# Patient Record
Sex: MALE | Race: WHITE
[De-identification: names, ages, dates, MRNs, and addresses within clinical notes are randomized per-mention and may not be internally consistent; named-entity substitution may affect disease eponyms.]

---

## 2018-12-10 ENCOUNTER — HOSPITAL ENCOUNTER (OUTPATIENT)
Age: 68
End: 2018-12-10
Payer: MEDICARE

## 2018-12-10 DIAGNOSIS — I08.3: Primary | ICD-10-CM

## 2018-12-10 PROCEDURE — 93306 TTE W/DOPPLER COMPLETE: CPT

## 2019-02-27 ENCOUNTER — HOSPITAL ENCOUNTER (OUTPATIENT)
Age: 69
End: 2019-02-27
Payer: MEDICARE

## 2019-02-27 DIAGNOSIS — R06.02: Primary | ICD-10-CM

## 2019-02-27 PROCEDURE — 94726 PLETHYSMOGRAPHY LUNG VOLUMES: CPT

## 2019-02-27 PROCEDURE — 94729 DIFFUSING CAPACITY: CPT

## 2019-02-27 PROCEDURE — 94060 EVALUATION OF WHEEZING: CPT

## 2019-03-01 NOTE — PM.PFT.1
Pulmonary Function Test
Referral & Results
Date Patient Seen: 02/27/19
Requesting provider: Janneth Carrillo
Indication: R06.02
Results: The spirometry demonstrates an FVC of 4.33 L which is 87% of predicted.

The FEV1 was measured at 3.19 L which is 86% of predicted.

The FEV1/FVC ratio was 70 for which is 99% of predicted.

Following the administration of bronchodilator there was a 34% improvement in FEF 25-75%.

Lung volumes show an SVC of 4.42 L which is 87% of predicted.

The diffusing capacity was measured at 25.90 which is 71% of predicted.  No hemoglobin value was provided, so no correction for potential anemia could be made, if appropriate.

The maximum voluntary ventilation was normal


Interpretation: This study demonstrates perhaps mild obstructive lung disease, based on slight reduction in FEV1, with some limited evidence of benefit following bronchodilator administration based on improvement in FEF 25-75%.

There may also be mild restrictive lung disease present based on minimal reduction in lung volumes

There is also reduction in diffusing capacity suggesting element of disease at the capillary alveolar level

Clinical correlation suggested

## 2021-04-09 ENCOUNTER — HOSPITAL ENCOUNTER (OUTPATIENT)
Age: 71
End: 2021-04-09
Payer: MEDICARE

## 2021-04-09 DIAGNOSIS — I34.0: ICD-10-CM

## 2021-04-09 DIAGNOSIS — I48.20: Primary | ICD-10-CM

## 2021-04-09 DIAGNOSIS — I77.810: ICD-10-CM

## 2021-04-09 PROCEDURE — 93306 TTE W/DOPPLER COMPLETE: CPT

## 2021-06-16 ENCOUNTER — HOSPITAL ENCOUNTER (OUTPATIENT)
Age: 71
End: 2021-06-16
Payer: MEDICARE

## 2021-06-16 DIAGNOSIS — E78.6: Primary | ICD-10-CM

## 2021-06-16 LAB
ALBUMIN SERPL-MCNC: 3.7 G/DL (ref 3.5–5)
ALBUMIN/GLOB SERPL: 1.2 {RATIO} (ref 1–2.8)
ALP SERPL-CCNC: 77 U/L (ref 38–126)
ALT SERPL-CCNC: 19 IU/L (ref ?–50)
BUN SERPL-MCNC: 23 MG/DL (ref 9–20)
CALCIUM SERPL-MCNC: 9.1 MG/DL (ref 8.4–10.2)
CHLORIDE SERPL-SCNC: 102 MMOL/L (ref 98–107)
CHOLEST SERPL-MCNC: 149 MG/DL (ref 140–199)
CO2 SERPL-SCNC: 29 MMOL/L (ref 22–32)
ESTIMATED GLOMERULAR FILT RATE: > 60 ML/MIN (ref 60–?)
GLOBULIN SER CALC-MCNC: 3 G/DL (ref 1.7–4.1)
GLUCOSE SERPL-MCNC: 91 MG/DL (ref 80–110)
HDLC SERPL-MCNC: 41 MG/DL (ref 40–60)
HEMOLYSIS: 15 (ref 0–50)
POTASSIUM SERPL-SCNC: 4.5 MMOL/L (ref 3.4–5.1)
PROT SERPL-MCNC: 6.7 G/DL (ref 6.3–8.2)
SODIUM SERPL-SCNC: 137 MMOL/L (ref 137–145)
TRIGL SERPL-MCNC: 140 MG/DL (ref 35–150)

## 2021-06-16 PROCEDURE — 80061 LIPID PANEL: CPT

## 2021-06-16 PROCEDURE — 80053 COMPREHEN METABOLIC PANEL: CPT

## 2022-03-29 ENCOUNTER — HOSPITAL ENCOUNTER (OUTPATIENT)
Age: 72
End: 2022-03-29
Payer: MEDICARE

## 2022-03-29 DIAGNOSIS — I08.3: Primary | ICD-10-CM

## 2022-03-29 DIAGNOSIS — I77.89: ICD-10-CM

## 2022-03-29 PROCEDURE — 93306 TTE W/DOPPLER COMPLETE: CPT

## 2022-06-03 ENCOUNTER — HOSPITAL ENCOUNTER (OUTPATIENT)
Age: 72
End: 2022-06-03
Payer: MEDICARE

## 2022-06-03 DIAGNOSIS — R07.89: Primary | ICD-10-CM

## 2022-06-03 LAB
ALBUMIN SERPL-MCNC: 4 G/DL (ref 3.5–5)
ALBUMIN/GLOB SERPL: 1.2 {RATIO} (ref 1–2.8)
ALP SERPL-CCNC: 77 U/L (ref 38–126)
ALT SERPL-CCNC: 24 IU/L (ref ?–50)
BUN SERPL-MCNC: 29 MG/DL (ref 9–20)
CALCIUM SERPL-MCNC: 8.7 MG/DL (ref 8.4–10.2)
CHLORIDE SERPL-SCNC: 107 MMOL/L (ref 98–107)
CHOLEST SERPL-MCNC: 152 MG/DL (ref 140–199)
CO2 SERPL-SCNC: 27 MMOL/L (ref 22–32)
ESTIMATED GLOMERULAR FILT RATE: > 60 ML/MIN (ref 60–?)
GLOBULIN SER CALC-MCNC: 3.4 G/DL (ref 1.7–4.1)
GLUCOSE SERPL-MCNC: 95 MG/DL (ref 80–110)
HDLC SERPL-MCNC: 41 MG/DL (ref 40–60)
HEMOLYSIS: < 15 (ref 0–50)
POTASSIUM SERPL-SCNC: 4.6 MMOL/L (ref 3.4–5.1)
PROT SERPL-MCNC: 7.4 G/DL (ref 6.3–8.2)
SODIUM SERPL-SCNC: 141 MMOL/L (ref 137–145)
TRIGL SERPL-MCNC: 98 MG/DL (ref 35–150)

## 2022-06-03 PROCEDURE — 80061 LIPID PANEL: CPT

## 2022-06-03 PROCEDURE — 80053 COMPREHEN METABOLIC PANEL: CPT

## 2023-06-13 ENCOUNTER — HOSPITAL ENCOUNTER (OUTPATIENT)
Age: 73
End: 2023-06-13
Payer: MEDICARE

## 2023-06-13 DIAGNOSIS — C61: ICD-10-CM

## 2023-06-13 DIAGNOSIS — I48.91: ICD-10-CM

## 2023-06-13 DIAGNOSIS — G47.33: Primary | ICD-10-CM

## 2023-06-13 DIAGNOSIS — E78.2: ICD-10-CM

## 2023-06-13 LAB
ADD MANUAL DIFF / SLIDE REVIEW: NO
ALBUMIN SERPL-MCNC: 4.1 G/DL (ref 3.5–5)
ALBUMIN/GLOB SERPL: 1.1 {RATIO} (ref 1–2.8)
ALP SERPL-CCNC: 81 U/L (ref 38–126)
ALT SERPL-CCNC: 25 IU/L (ref ?–50)
BUN SERPL-MCNC: 21 MG/DL (ref 9–20)
CALCIUM SERPL-MCNC: 9.2 MG/DL (ref 8.4–10.2)
CHLORIDE SERPL-SCNC: 101 MMOL/L (ref 98–107)
CHOLEST SERPL-MCNC: 175 MG/DL (ref 140–199)
CO2 SERPL-SCNC: 32 MMOL/L (ref 22–32)
ESTIMATED GLOMERULAR FILT RATE: > 60 ML/MIN (ref 60–?)
GLOBULIN SER CALC-MCNC: 3.7 G/DL (ref 1.7–4.1)
GLUCOSE SERPL-MCNC: 93 MG/DL (ref 80–110)
HDLC SERPL-MCNC: 39 MG/DL (ref 40–60)
HEMATOCRIT: 46.7 % (ref 41–53)
HEMOGLOBIN: 15.9 G/DL (ref 13.5–17.5)
HEMOLYSIS: < 15 (ref 0–50)
LYMPHOCYTES # SPEC AUTO: 1300 /UL (ref 1100–4500)
MCV RBC: 103.5 FL (ref 80–100)
MEAN CORPUSCULAR HEMOGLOBIN: 35.2 PG (ref 26–34)
MEAN CORPUSCULAR HGB CONC: 34 % (ref 30–36)
PLATELET COUNT: 187 X10^3/UL (ref 150–400)
POTASSIUM SERPL-SCNC: 4.8 MMOL/L (ref 3.4–5.1)
PROT SERPL-MCNC: 7.8 G/DL (ref 6.3–8.2)
PSA SERPL-MCNC: < 0.064 NG/ML (ref 0.1–4)
SODIUM SERPL-SCNC: 137 MMOL/L (ref 137–145)
TRIGL SERPL-MCNC: 107 MG/DL (ref 35–150)

## 2023-06-13 PROCEDURE — 80061 LIPID PANEL: CPT

## 2023-06-13 PROCEDURE — 85025 COMPLETE CBC W/AUTO DIFF WBC: CPT

## 2023-06-13 PROCEDURE — 80053 COMPREHEN METABOLIC PANEL: CPT

## 2023-06-13 PROCEDURE — 84153 ASSAY OF PSA TOTAL: CPT

## 2023-07-12 ENCOUNTER — HOSPITAL ENCOUNTER (OUTPATIENT)
Age: 73
End: 2023-07-12
Payer: MEDICARE

## 2023-07-12 DIAGNOSIS — D75.89: Primary | ICD-10-CM

## 2023-07-12 LAB
FOLATE SERPL-MCNC: > 20 NG/ML (ref 2.76–20)
VIT B12 SERPL-MCNC: 444 PG/ML (ref 239–931)

## 2023-07-12 PROCEDURE — 82607 VITAMIN B-12: CPT

## 2023-07-12 PROCEDURE — 82746 ASSAY OF FOLIC ACID SERUM: CPT

## 2023-08-17 ENCOUNTER — HOSPITAL ENCOUNTER (EMERGENCY)
Age: 73
Discharge: HOME | End: 2023-08-17
Payer: MEDICARE

## 2023-08-17 VITALS
SYSTOLIC BLOOD PRESSURE: 130 MMHG | OXYGEN SATURATION: 97 % | DIASTOLIC BLOOD PRESSURE: 72 MMHG | HEART RATE: 68 BPM | RESPIRATION RATE: 18 BRPM | TEMPERATURE: 98.3 F

## 2023-08-17 VITALS — OXYGEN SATURATION: 97 % | DIASTOLIC BLOOD PRESSURE: 61 MMHG | HEART RATE: 62 BPM | SYSTOLIC BLOOD PRESSURE: 129 MMHG

## 2023-08-17 VITALS — BODY MASS INDEX: 28.6 KG/M2

## 2023-08-17 DIAGNOSIS — M25.561: Primary | ICD-10-CM

## 2023-08-17 DIAGNOSIS — R79.1: ICD-10-CM

## 2023-08-17 LAB
ADD MANUAL DIFF / SLIDE REVIEW: NO
HEMATOCRIT: 47.6 % (ref 41–53)
HEMOGLOBIN: 16.3 G/DL (ref 13.5–17.5)
INR PPP: 3 (ref 0.9–1.3)
LYMPHOCYTES # SPEC AUTO: 1800 /UL (ref 1100–4500)
MCV RBC: 102.8 FL (ref 80–100)
MEAN CORPUSCULAR HEMOGLOBIN: 35.3 PG (ref 26–34)
MEAN CORPUSCULAR HGB CONC: 34.3 % (ref 30–36)
PLATELET COUNT: 174 X10^3/UL (ref 150–400)
PROTHROMBIN TIME: 34.8 SECONDS (ref 10.1–12.7)

## 2023-08-17 PROCEDURE — 99284 EMERGENCY DEPT VISIT MOD MDM: CPT

## 2023-08-17 PROCEDURE — 85025 COMPLETE CBC W/AUTO DIFF WBC: CPT

## 2023-08-17 PROCEDURE — 85610 PROTHROMBIN TIME: CPT

## 2023-08-17 PROCEDURE — 73562 X-RAY EXAM OF KNEE 3: CPT

## 2023-08-17 PROCEDURE — 86140 C-REACTIVE PROTEIN: CPT

## 2023-08-17 PROCEDURE — 36415 COLL VENOUS BLD VENIPUNCTURE: CPT

## 2023-09-14 ENCOUNTER — HOSPITAL ENCOUNTER (EMERGENCY)
Age: 73
Discharge: HOME | End: 2023-09-14
Payer: MEDICARE

## 2023-09-14 VITALS — DIASTOLIC BLOOD PRESSURE: 84 MMHG | HEART RATE: 119 BPM | SYSTOLIC BLOOD PRESSURE: 137 MMHG

## 2023-09-14 VITALS
RESPIRATION RATE: 17 BRPM | SYSTOLIC BLOOD PRESSURE: 139 MMHG | DIASTOLIC BLOOD PRESSURE: 72 MMHG | HEART RATE: 100 BPM | OXYGEN SATURATION: 99 %

## 2023-09-14 VITALS
OXYGEN SATURATION: 98 % | TEMPERATURE: 96.44 F | HEART RATE: 88 BPM | SYSTOLIC BLOOD PRESSURE: 131 MMHG | RESPIRATION RATE: 20 BRPM | DIASTOLIC BLOOD PRESSURE: 94 MMHG

## 2023-09-14 VITALS — BODY MASS INDEX: 27.3 KG/M2

## 2023-09-14 DIAGNOSIS — Z79.01: ICD-10-CM

## 2023-09-14 DIAGNOSIS — R04.0: Primary | ICD-10-CM

## 2023-09-14 DIAGNOSIS — I48.91: ICD-10-CM

## 2023-09-14 PROCEDURE — 30903 CONTROL OF NOSEBLEED: CPT

## 2023-09-14 PROCEDURE — 99283 EMERGENCY DEPT VISIT LOW MDM: CPT

## 2024-10-29 ENCOUNTER — HOSPITAL ENCOUNTER (OUTPATIENT)
Age: 74
End: 2024-10-29
Payer: MEDICARE

## 2024-10-29 DIAGNOSIS — E78.5: Primary | ICD-10-CM

## 2024-10-29 DIAGNOSIS — I48.20: ICD-10-CM

## 2024-10-29 LAB
ALBUMIN SERPL-MCNC: 4.2 G/DL (ref 3.5–5)
ALBUMIN/GLOB SERPL: 1.1 {RATIO} (ref 1–2.8)
ALP SERPL-CCNC: 87 U/L (ref 38–126)
ALT SERPL-CCNC: 29 IU/L (ref ?–50)
BUN SERPL-MCNC: 31 MG/DL (ref 9–20)
CALCIUM SERPL-MCNC: 9.4 MG/DL (ref 8.4–10.2)
CHLORIDE SERPL-SCNC: 102 MMOL/L (ref 98–107)
CHOLEST SERPL-MCNC: 131 MG/DL (ref 140–199)
CO2 SERPL-SCNC: 29 MMOL/L (ref 22–32)
ESTIMATED GLOMERULAR FILT RATE: > 60 ML/MIN (ref 60–?)
GLOBULIN SER CALC-MCNC: 3.7 G/DL (ref 1.7–4.1)
GLUCOSE SERPL-MCNC: 105 MG/DL (ref 80–110)
HDLC SERPL-MCNC: 48 MG/DL (ref 40–60)
HEMATOCRIT: 48.1 % (ref 41–53)
HEMOGLOBIN: 16.4 G/DL (ref 13.5–17.5)
HEMOLYSIS: 23 (ref 0–50)
MAGNESIUM SERPL-MCNC: 1.8 MG/DL (ref 1.6–2.3)
MCV RBC: 105.9 FL (ref 80–100)
MEAN CORPUSCULAR HEMOGLOBIN: 36.2 PG (ref 26–34)
MEAN CORPUSCULAR HGB CONC: 34.2 % (ref 30–36)
PLATELET COUNT: 215 X10^3/UL (ref 150–400)
POTASSIUM SERPL-SCNC: 5 MMOL/L (ref 3.4–5.1)
PROT SERPL-MCNC: 7.9 G/DL (ref 6.3–8.2)
SODIUM SERPL-SCNC: 136 MMOL/L (ref 137–145)
TRIGL SERPL-MCNC: 75 MG/DL (ref 35–150)
TSH SERPL DL<=0.05 MIU/L-ACNC: 2.17 UIU/ML (ref 0.47–4.68)

## 2024-10-29 PROCEDURE — 85027 COMPLETE CBC AUTOMATED: CPT

## 2024-10-29 PROCEDURE — 83735 ASSAY OF MAGNESIUM: CPT

## 2024-10-29 PROCEDURE — 84443 ASSAY THYROID STIM HORMONE: CPT

## 2024-10-29 PROCEDURE — 80061 LIPID PANEL: CPT

## 2024-10-29 PROCEDURE — 80053 COMPREHEN METABOLIC PANEL: CPT

## 2025-01-13 ENCOUNTER — HOSPITAL ENCOUNTER (OUTPATIENT)
Dept: HOSPITAL 73 - CT | Age: 75
End: 2025-01-13
Payer: MEDICARE

## 2025-01-13 DIAGNOSIS — N62: ICD-10-CM

## 2025-01-13 DIAGNOSIS — F17.218: ICD-10-CM

## 2025-01-13 DIAGNOSIS — Z12.2: Primary | ICD-10-CM

## 2025-01-13 DIAGNOSIS — J98.4: ICD-10-CM

## 2025-01-13 DIAGNOSIS — J47.9: ICD-10-CM

## 2025-01-13 PROCEDURE — 71271 CT THORAX LUNG CANCER SCR C-: CPT

## 2025-01-13 NOTE — DI.CT.S_ITS
PROCEDURE:  CT LUNG LOW DOSE SCREENING 
  
INDICATIONS:  lung cancer screening 
  
TECHNIQUE:   
Noncontrast 2.0-2.5 mm thick sections acquired from the pulmonary apices to the posterior  
costophrenic angles.  7 mm thick axial MIP, and 5 mm coronal and sagittal reformats were  
then acquired.  For radiation dose reduction, the following was used:  automated exposure  
control, adjustment of mA and/or kV according to patient size.   
  
COMPARISON:  [facility], CT, CT LOW DOSE LUNG CA SCREENING, 6/20/2023, 10:38.  
 [facility], CT, CT LOW DOSE LUNG CA SCREENING, 6/27/2022, 13:19. 
  
FINDINGS:   
Image quality:  Diagnostic.   
  
Lower Neck: No enlarged lymph nodes.   
Thyroid: No thyroid nodules which require sonographic follow up, per consensus  
guidelines. 
Axillae: No enlarged lymph nodes. 
Chest Wall:  Gynecomastia.   
Bones:  No suspicious osseous lesion.    
  
Lungs and Pleura: No pneumothorax or pleural effusions.  No consolidation.  Peripheral  
reticular thickening, similar.  No honeycombing appreciated.  Minimal secretions in the  
lower trachea.  Mild bronchiectasis.  Numerous calcified granuloma.  No new pulmonary  
nodules identified.  No mass. 
  
Heart: Heart size is normal.  No pericardial effusion. 
Thoracic Vessels: The aorta and pulmonary arteries demonstrate normal size.   
Mediastinum and Sharon: No enlarged lymph nodes.   
Esophagus: No wall thickening. No hiatal hernia.    
  
Upper Abdomen:  Visualized upper abdomen solid organs and bowel loops appear normal.   
Calcified granuloma in the spleen. 
  
  
IMPRESSION:   
  
No suspicious pulmonary nodules identified. 
  
LUNG-RADS 1; continued annual screening, if eligible. 
  
Clinically Significant Non-pulmonary Findings:  Peripheral reticular thickening.  Mild  
bronchiectasis.  Findings consistent with interstitial lung disease.  Not significantly  
changed. 
  
  
Dictated by: Yayo Moon M.D. on 1/13/2025 at 11:39      
Approved by: Yayo Moon M.D. on 1/13/2025 at 11:48

## 2025-01-29 ENCOUNTER — HOSPITAL ENCOUNTER (OUTPATIENT)
Dept: HOSPITAL 73 - ECHO | Age: 75
End: 2025-01-29
Payer: MEDICARE

## 2025-01-29 DIAGNOSIS — I77.89: ICD-10-CM

## 2025-01-29 DIAGNOSIS — I08.3: Primary | ICD-10-CM

## 2025-01-29 DIAGNOSIS — I77.810: ICD-10-CM

## 2025-01-29 PROCEDURE — 93306 TTE W/DOPPLER COMPLETE: CPT

## 2025-01-29 NOTE — DI.ECHO.S_ITS
[facility] 
+---------+                        [facility] 
:         :                      [id number] 
:         :                      [address] [place] 
:         :                          [id number] 
:         :                       Phone: [phone] 
[phone]                        [phone] 
                             Echocardiogram Report 
+-----------------------------------------------------------------------------+ 
:Name: SY HOYT        Study Date: 2025         Height: 72 in  : 
:[facility] MRN #: [id number]     ReadingLocation:               Weight: 210 lb : 
:Account #: [id number]          Gender: Male                   BSA: 2.2 m2    : 
:: 1950                Age: 74 yrs                    BP: 139/77 mmHg: 
:Reason For Study: MITRAL VALVE REGURGITATION                                 : 
:Ordering Physician: ROLA,                                                 : 
:DALILA                          Performed By: Tisha Barry                    : 
:Referring: DALILA CANELA                                                    : 
+-----------------------------------------------------------------------------+ 
Interpretation Summary 
The patient was in atrial fibrillation with heart rates between 62-76 bpm 
during the exam. 
  
The left ventricle is normal in size. 
The left ventricular ejection fraction is normal. 
The ejection fraction is estimated to be 60-65%. No significant change in LVEF 
from the previous study. 
  
The right ventricle is mildly dilated. 
The right ventricular systolic function is normal. 
  
There is mild to moderate mitral regurgitation. 
Mild to moderate AI. 
The tricuspid annulus is dilated. 
There is mild to moderate tricuspid regurgitation. 
The right ventricular systolic pressure is estimated to be at least 42 mmHg 
based on an estimated right atrial pressure of 8 mm Hg. 
  
Previous echo, mild MR, mild AR, mild TR and PASP 24 mmHg. 
  
The ascending aorta is mildly enlarged. 0.0 cm in diameter. Previously 4.1 cm 
in diameter. 
  
Procedure:   A two-dimensional transthoracic echocardiogram with color flow 
and Doppler was performed. The study quality was technically adequate. 
Comparison is made with the echocardiogram of 2022. The patient was in 
atrial fibrillation with heart rates between 62-76 bpm during the exam. 
Left Ventricle:   The left ventricle is normal in size. Left ventricular wall 
thickness is mildly increased. There is no thrombus. The ejection fraction is 
estimated to be 60-65%. The left ventricular ejection fraction is normal. 
There are no focal wall motion abnormalities. Diastolic function could not be 
accurately assessed due to atrial fibrillation. 
Right Ventricle:   The right ventricle is mildly dilated. The right 
ventricular systolic function is normal. 
Atria:   The left atrium is severely dilated. There has been no significant 
change since the previous study. The right atrium is severely dilated. The 
right atrium has mildly increased in size since the prior echo exam. There is 
no Doppler evidence for an interatrial shunt. 
Mitral Valve:   The mitral valve leaflets appear mildly thickened, but open 
well. There is mild to moderate mitral annular calcification. There is mild to 
moderate mitral regurgitation. 
Aortic Valve:   The aortic valve is trileaflet. The aortic valve opens well. 
The aortic valve is mildly calcified. There is no aortic valve stenosis. There 
is mild to moderate aortic regurgitation. 
Tricuspid Valve:   The tricuspid annulus is dilated. There is mild to moderate 
tricuspid regurgitation. The right ventricular systolic pressure is estimated 
to be at least 42 mmHg based on an estimated right atrial pressure of 8 mm Hg. 
  
Pulmonic Valve:   The pulmonic valve leaflets are thin and pliable; valve 
motion is normal. There is trace pulmonic regurgitation. 
Great Vessels:   The aortic root is normal size. The ascending aorta is mildly 
enlarged. The IVC is dilated (diameter is greater than 2.1 cm) yet it 
collapses greater than 50% with a sniff. This suggests a right atrial pressure 
of 8 mm Hg. 
Pericardium/ Pleura   There is no pericardial effusion. There is no pleural 
effusion. 
  
MMode/2D Measurements & Calculations 
LVIDd: 4.3 cm                            LVOT diam: 2.3 cm 
LVIDs: 2.8 cm                            Ao root diam: 3.5 cm 
FS: 34.7 %                               asc Aorta Diam: 4.0 cm 
EPSS: 0.27 cm                            Ao Arch Diam (Prox Trans): 3.3 cm 
IVSd: 1.1 cm 
LVPWd: 1.3 cm 
LV dent. diameter/BSA (cm/m^2): 2.0 
LV sys. diameter/BSA (cm/m^2): 1.3 
        
______________________________________________________________________________ 
LA A2 area: 40.2 cm2                     RA long axis: 6.2 cm 
LA A4 area: 35.5 cm2                     RA area: 27.2 cm2 
LA length (vol): 7.5 cm                  RA vol: 101.5 ml 
LA vol: 162.1 ml                         RA VI: 46.7 ml/m2 
LA vol index: 74.5 ml/m2                 IVC diam: 2.1 cm 
  
        
______________________________________________________________________________ 
RVD1 (basal): 4.1 cm 
RVD2 (mid): 2.5 cm 
TAPSE: 2.1 cm 
  
Doppler Measurements & Calculations 
Ao V2 max: 125.3 cm/sec            LVOT Max Mario: 97.1 cm/sec 
Ao V2 mean: 93.5 cm/sec            LV V1 max PG: 3.8 mmHg 
Ao max P.3 mmHg                LV V1 VTI: 18.3 cm 
Ao mean PG: 3.8 mmHg               JULIUS(I,D): 3.5 cm2 
Ao V2 VTI: 22.4 cm                 JULIUS(V,D): 3.3 cm2 
                                   AS sev ratio: 0.82 
                                   JULIUS indexed to BSA (cm^2/m^2): 1.6 
                                   AI P1/2t: 668.6 msec 
                                   AI dec slope: 182.5 cm/sec2 
        
______________________________________________________________________________ 
MV E max mario: 90.6 cm/sec          TR max mario: 291.3 cm/sec 
MV A max mario: 1.5 cm/sec           TR max P.9 mmHg 
MV E/A: 59.3                       PA V2 max: 103.6 cm/sec 
Med Peak E' Mario: 10.2 cm/sec       PA V2 mean: 69.7 cm/sec 
E/E' med: 8.9                      PA mean P.2 mmHg 
Lat Peak E' Mario: 12.7 cm/sec       PA pr(Accel): 20.8 mmHg 
E/E' lat: 7.1 
E/e' average: 8.0 
MV dec time: 0.14 sec 
  
        
______________________________________________________________________________ 
SV(LVOT): 77.4 ml 
  
______________________________________________________________________________ 
                  Electronically signed by: Dalila Canela on 2025 
Reading Physician:04:44 PM

## 2025-02-25 ENCOUNTER — HOSPITAL ENCOUNTER (OUTPATIENT)
Age: 75
End: 2025-02-25
Payer: MEDICARE

## 2025-02-25 DIAGNOSIS — C61: ICD-10-CM

## 2025-02-25 DIAGNOSIS — D69.9: Primary | ICD-10-CM

## 2025-02-25 DIAGNOSIS — D75.89: ICD-10-CM

## 2025-02-25 LAB
BASOPHILS NFR SPEC MANUAL: 1 % (ref 0–1)
EOSINOPHILS PERCENT MANUAL: 2 % (ref 2–4)
FOLATE SERPL-MCNC: 16.9 NG/ML (ref 2.76–20)
HEMATOCRIT: 48.9 % (ref 41–53)
HEMOGLOBIN: 16.5 G/DL (ref 13.5–17.5)
LYMPHOCYTES PERCENT MANUAL: 18 % (ref 25–45)
MACROCYTES BLD QL: (no result)
MCV RBC: 106.4 FL (ref 80–100)
MEAN CORPUSCULAR HEMOGLOBIN: 35.9 PG (ref 26–34)
MEAN CORPUSCULAR HGB CONC: 33.8 % (ref 30–36)
MONOCYTES PERCENT MANUAL: 10 % (ref 2–11)
NEUTROPHILS ABSOLUTE MANUAL: 4209 /UL (ref 3000–5900)
NEUTS SEG NFR BLD: 69 % (ref 38–70)
PLATELET COUNT: 204 X10^3/UL (ref 150–400)
PSA SERPL-MCNC: < 0.064 NG/ML (ref 0.1–4)
PTT PARTIAL THROMBOPLASTIN TIM: 73 SECONDS (ref 25.1–36.5)
RETICULOCYTE COUNT, PERCENT: 0.9 % (ref 0.9–2.6)
TOTAL CELLS COUNTED: 100
VIT B12 SERPL-MCNC: 486 PG/ML (ref 239–931)

## 2025-02-25 PROCEDURE — 85025 COMPLETE CBC W/AUTO DIFF WBC: CPT

## 2025-02-25 PROCEDURE — 82525 ASSAY OF COPPER: CPT

## 2025-02-25 PROCEDURE — 85730 THROMBOPLASTIN TIME PARTIAL: CPT

## 2025-02-25 PROCEDURE — 85045 AUTOMATED RETICULOCYTE COUNT: CPT

## 2025-02-25 PROCEDURE — 83883 ASSAY NEPHELOMETRY NOT SPEC: CPT

## 2025-02-25 PROCEDURE — 82784 ASSAY IGA/IGD/IGG/IGM EACH: CPT

## 2025-02-25 PROCEDURE — 82607 VITAMIN B-12: CPT

## 2025-02-25 PROCEDURE — 86334 IMMUNOFIX E-PHORESIS SERUM: CPT

## 2025-02-25 PROCEDURE — 84155 ASSAY OF PROTEIN SERUM: CPT

## 2025-02-25 PROCEDURE — 82746 ASSAY OF FOLIC ACID SERUM: CPT

## 2025-02-25 PROCEDURE — 84153 ASSAY OF PSA TOTAL: CPT

## 2025-02-25 PROCEDURE — 84165 PROTEIN E-PHORESIS SERUM: CPT

## 2025-02-27 LAB
KAPPA LC FREE SER-MCNC: 30.6 MG/L (ref 3.3–19.4)
LAMBDA LC FREE SERPL-MCNC: 21.8 MG/L (ref 5.7–26.3)

## 2025-06-02 ENCOUNTER — HOSPITAL ENCOUNTER (OUTPATIENT)
Age: 75
End: 2025-06-02
Payer: MEDICARE

## 2025-06-02 DIAGNOSIS — D75.89: Primary | ICD-10-CM

## 2025-06-02 PROCEDURE — 76705 ECHO EXAM OF ABDOMEN: CPT

## 2025-06-24 ENCOUNTER — HOSPITAL ENCOUNTER (OUTPATIENT)
Age: 75
End: 2025-06-24
Payer: MEDICARE

## 2025-06-24 DIAGNOSIS — R25.1: ICD-10-CM

## 2025-06-24 DIAGNOSIS — F80.81: ICD-10-CM

## 2025-06-24 DIAGNOSIS — D75.89: Primary | ICD-10-CM

## 2025-06-24 DIAGNOSIS — R26.89: ICD-10-CM

## 2025-06-24 DIAGNOSIS — R29.90: ICD-10-CM

## 2025-06-24 LAB
ALBUMIN SERPL-MCNC: 4.1 G/DL (ref 3.5–5)
ALBUMIN/GLOB SERPL: 1.3 {RATIO} (ref 1–2.8)
ALP SERPL-CCNC: 103 U/L (ref 38–126)
ALT SERPL-CCNC: 24 IU/L (ref ?–50)
AST SERPL-CCNC: 33 IU/L (ref 17–59)
BILIRUB SERPL-MCNC: 1 MG/DL (ref 0.2–1.3)
BUN SERPL-MCNC: 41 MG/DL (ref 9–20)
BUN/CREAT SERPL: 33.6 (ref 6–22)
CALCIUM SERPL-MCNC: 9.3 MG/DL (ref 8.4–10.2)
CHLORIDE SERPL-SCNC: 104 MMOL/L (ref 98–107)
CO2 SERPL-SCNC: 25 MMOL/L (ref 22–32)
CREAT SERPL-MCNC: 1.22 MG/DL (ref 0.66–1.25)
ESTIMATED GLOMERULAR FILT RATE: > 60 ML/MIN (ref 60–?)
GLOBULIN SER CALC-MCNC: 3.2 G/DL (ref 1.7–4.1)
GLUCOSE SERPL-MCNC: 94 MG/DL (ref 70–99)
HEMATOCRIT: 48.3 % (ref 41–53)
HEMOGLOBIN: 16.6 G/DL (ref 13.5–17.5)
HEMOLYSIS: 23 (ref 0–50)
MCH RBC QN AUTO: 36.3 PG (ref 26–34)
MCV RBC: 105.7 FL (ref 80–100)
MEAN CORPUSCULAR HGB CONC: 34.3 % (ref 30–36)
PLATELET COUNT: 206 X10^3/UL (ref 150–400)
POTASSIUM SERPL-SCNC: 4.7 MMOL/L (ref 3.4–5.1)
PROT SERPL-MCNC: 7.3 G/DL (ref 6.3–8.2)
RED BLOOD CELL COUNT: 4.57 X10^6/UL (ref 4.5–5.9)
RED CELL DISTRIBUTION WIDTH: 14.3 % (ref 11.6–14.8)
SODIUM SERPL-SCNC: 138 MMOL/L (ref 137–145)
WHITE BLOOD CELL COUNT: 6.7 X10^3/UL (ref 4.5–11)

## 2025-06-24 PROCEDURE — 82390 ASSAY OF CERULOPLASMIN: CPT

## 2025-06-24 PROCEDURE — 82525 ASSAY OF COPPER: CPT

## 2025-06-24 PROCEDURE — 85027 COMPLETE CBC AUTOMATED: CPT

## 2025-06-24 PROCEDURE — 83921 ORGANIC ACID SINGLE QUANT: CPT

## 2025-06-24 PROCEDURE — 83883 ASSAY NEPHELOMETRY NOT SPEC: CPT

## 2025-06-24 PROCEDURE — 84165 PROTEIN E-PHORESIS SERUM: CPT

## 2025-06-24 PROCEDURE — 84155 ASSAY OF PROTEIN SERUM: CPT

## 2025-06-24 PROCEDURE — 80053 COMPREHEN METABOLIC PANEL: CPT

## 2025-07-07 ENCOUNTER — HOSPITAL ENCOUNTER (OUTPATIENT)
Dept: HOSPITAL 73 - MRI | Age: 75
End: 2025-07-07
Payer: MEDICARE

## 2025-07-07 DIAGNOSIS — R25.1: ICD-10-CM

## 2025-07-07 DIAGNOSIS — R29.90: ICD-10-CM

## 2025-07-07 DIAGNOSIS — I67.82: Primary | ICD-10-CM

## 2025-07-07 DIAGNOSIS — F80.81: ICD-10-CM

## 2025-07-07 DIAGNOSIS — F80.9: ICD-10-CM

## 2025-07-07 DIAGNOSIS — R26.89: ICD-10-CM

## 2025-07-07 PROCEDURE — 70551 MRI BRAIN STEM W/O DYE: CPT
